# Patient Record
Sex: FEMALE
[De-identification: names, ages, dates, MRNs, and addresses within clinical notes are randomized per-mention and may not be internally consistent; named-entity substitution may affect disease eponyms.]

---

## 2022-01-22 ENCOUNTER — HOSPITAL ENCOUNTER (EMERGENCY)
Dept: HOSPITAL 19 - COL.ER | Age: 42
Discharge: HOME | End: 2022-01-22
Attending: PERSONAL EMERGENCY RESPONSE ATTENDANT
Payer: SELF-PAY

## 2022-01-22 VITALS — SYSTOLIC BLOOD PRESSURE: 102 MMHG | HEART RATE: 75 BPM | DIASTOLIC BLOOD PRESSURE: 53 MMHG

## 2022-01-22 VITALS — WEIGHT: 127.21 LBS | BODY MASS INDEX: 18.21 KG/M2 | HEIGHT: 70 IN

## 2022-01-22 VITALS — TEMPERATURE: 97.6 F

## 2022-01-22 DIAGNOSIS — R51.9: Primary | ICD-10-CM

## 2022-01-22 DIAGNOSIS — R11.0: ICD-10-CM

## 2022-01-22 DIAGNOSIS — Z86.69: ICD-10-CM

## 2022-03-08 ENCOUNTER — HOSPITAL ENCOUNTER (OUTPATIENT)
Dept: HOSPITAL 19 - ZCOL.LAB | Age: 42
End: 2022-03-08
Attending: OTOLARYNGOLOGY
Payer: COMMERCIAL

## 2022-03-08 DIAGNOSIS — J34.89: Primary | ICD-10-CM

## 2022-07-02 ENCOUNTER — HOSPITAL ENCOUNTER (EMERGENCY)
Dept: HOSPITAL 19 - COL.ER | Age: 42
Discharge: HOME | End: 2022-07-02
Attending: STUDENT IN AN ORGANIZED HEALTH CARE EDUCATION/TRAINING PROGRAM
Payer: COMMERCIAL

## 2022-07-02 VITALS — DIASTOLIC BLOOD PRESSURE: 70 MMHG | HEART RATE: 82 BPM | SYSTOLIC BLOOD PRESSURE: 112 MMHG

## 2022-07-02 VITALS — WEIGHT: 135.36 LBS | BODY MASS INDEX: 19.38 KG/M2 | HEIGHT: 70 IN

## 2022-07-02 VITALS — TEMPERATURE: 98 F

## 2022-07-02 DIAGNOSIS — N13.2: Primary | ICD-10-CM

## 2022-07-02 DIAGNOSIS — Z32.02: ICD-10-CM

## 2022-07-02 LAB
ALBUMIN SERPL-MCNC: 3.9 GM/DL (ref 3.5–5)
ALP SERPL-CCNC: 68 U/L (ref 40–150)
ALT SERPL-CCNC: 15 U/L (ref 0–55)
ANION GAP SERPL CALC-SCNC: 13 MMOL/L (ref 7–16)
AST SERPL-CCNC: 23 U/L (ref 5–34)
BASOPHILS # BLD: 0.1 K/MM3 (ref 0–0.2)
BASOPHILS NFR BLD AUTO: 0.7 % (ref 0–2)
BILIRUB SERPL-MCNC: 0.3 MG/DL (ref 0.2–1.2)
BUN SERPL-MCNC: 19 MG/DL (ref 7–19)
CALCIUM SERPL-MCNC: 9 MG/DL (ref 8.4–10.2)
CHLORIDE SERPL-SCNC: 105 MMOL/L (ref 98–107)
CO2 SERPL-SCNC: 23 MMOL/L (ref 22–29)
CREAT SERPL-SCNC: 0.8 MG/DL (ref 0.57–1.11)
EOSINOPHIL # BLD: 0.1 K/MM3 (ref 0–0.7)
EOSINOPHIL NFR BLD: 1.7 % (ref 0–4)
ERYTHROCYTE [DISTWIDTH] IN BLOOD BY AUTOMATED COUNT: 13.4 % (ref 11.5–14.5)
GLUCOSE SERPL-MCNC: 132 MG/DL (ref 70–99)
GRANULOCYTES # BLD AUTO: 64.8 % (ref 42.2–75.2)
HCT VFR BLD AUTO: 38 % (ref 37–47)
HGB BLD-MCNC: 12.7 G/DL (ref 12.5–16)
LIPASE SERPL-CCNC: 28 U/L (ref 8–78)
LYMPHOCYTES # BLD: 1.7 K/MM3 (ref 1.2–3.4)
LYMPHOCYTES NFR BLD: 24.6 % (ref 20–51)
MCH RBC QN AUTO: 29 PG (ref 27–31)
MCHC RBC AUTO-ENTMCNC: 33 G/DL (ref 33–37)
MCV RBC AUTO: 85 FL (ref 80–100)
MONOCYTES # BLD: 0.6 K/MM3 (ref 0.1–0.6)
MONOCYTES NFR BLD AUTO: 7.9 % (ref 1.7–9.3)
NEUTROPHILS # BLD: 4.5 K/MM3 (ref 1.4–6.5)
PH UR STRIP.AUTO: 5 [PH] (ref 5–8)
PLATELET # BLD AUTO: 240 K/MM3 (ref 130–400)
PMV BLD AUTO: 9.1 FL (ref 7.4–10.4)
POTASSIUM SERPL-SCNC: 4.2 MMOL/L (ref 3.5–4.5)
PROT SERPL-MCNC: 7.9 GM/DL (ref 6.2–8.1)
RBC # BLD AUTO: 4.45 M/MM3 (ref 4.1–5.3)
RBC # UR STRIP.AUTO: (no result) /UL
RBC # UR: (no result) /HPF (ref 0–2)
SODIUM SERPL-SCNC: 141 MMOL/L (ref 136–145)
SP GR UR STRIP.AUTO: 1.03 (ref 1–1.03)
SQUAMOUS # URNS: (no result) /HPF (ref 0–10)
URN COLLECT METHOD CLASS: (no result)

## 2023-09-14 ENCOUNTER — HOSPITAL ENCOUNTER (OUTPATIENT)
Dept: HOSPITAL 19 - SDCO | Age: 43
Discharge: HOME | End: 2023-09-14
Attending: FAMILY MEDICINE
Payer: COMMERCIAL

## 2023-09-14 VITALS — DIASTOLIC BLOOD PRESSURE: 48 MMHG | SYSTOLIC BLOOD PRESSURE: 104 MMHG | HEART RATE: 85 BPM

## 2023-09-14 VITALS — TEMPERATURE: 97.9 F | HEART RATE: 86 BPM | DIASTOLIC BLOOD PRESSURE: 56 MMHG | SYSTOLIC BLOOD PRESSURE: 135 MMHG

## 2023-09-14 VITALS — SYSTOLIC BLOOD PRESSURE: 106 MMHG | DIASTOLIC BLOOD PRESSURE: 62 MMHG | TEMPERATURE: 97 F | HEART RATE: 85 BPM

## 2023-09-14 VITALS — HEART RATE: 84 BPM | DIASTOLIC BLOOD PRESSURE: 64 MMHG | SYSTOLIC BLOOD PRESSURE: 106 MMHG

## 2023-09-14 VITALS — WEIGHT: 137.35 LBS | BODY MASS INDEX: 19.66 KG/M2 | HEIGHT: 70 IN

## 2023-09-14 DIAGNOSIS — Z12.11: Primary | ICD-10-CM

## 2023-09-14 DIAGNOSIS — K63.5: ICD-10-CM

## 2023-09-14 NOTE — NUR
PATIENT AMBULATORY  TO [facility] AND ORIENTED TO ROOM.  REPORTS GOOD RESULTS FROM
THE PREP BUT IS C/O NAUSEA AND FEELING WEAK.  IVF STARTED AFTER GETTING
CONSENTS SIGNED AND RUNNING AT 125CC/HR.  ZOFRAN 4MG IV GIVEN FOR C/O NAUSEA.
CALL LIGHT IN REACH.  STATES THAT SHE FEELS LIKE SHE IS GETTING A MIGRAINE
H/A.  LIGHTS DIMMED IN THE ROOM.  COVERED WITH WARM BLANKETS AND IS RESTING IN
RECLINER.

## 2023-09-14 NOTE — NUR
1130-PT ARRIVED TO [facility] VIA CART FROM ENDO SUITE. PT AMBULATED WITH
ASSISTANCE TO RECLINER, WARM BLANKETS PROVIDED. VITAL SIGNS TAKEN, VSS. REPORT
OBTAINED FROM [facility]. PT DENIES ABDOMINAL PAIN OR NAUSEA, REPORTS MIGRAINE
SYMPTOMS IMPROVED SINCE ADMISSION. PO INTAKE OFFERED.
 
1145-PT TOLERATING PO INTAKE WELL. VSS
 
1150-DISCHARGE INSTRUCTIONS REVIEWED WITH PT, QUESTIONS ANSWERED. PT CHANGED
INDEPENDENTLY AND AMBULATED TO BATHROOM. IV CATHETER DISCONTINUED, TIP INTACT.
PRESSURE BANDAGE APPLIED.
 
1200-PT DISCHARGED HOME TO [facility] VIA WHEELCHAIR, ACCOMPANIED BY SPOUSE. ALL
BELONGINGS AND DC INSTRUCTIONS SENT WITH PT.